# Patient Record
Sex: MALE | Race: OTHER
[De-identification: names, ages, dates, MRNs, and addresses within clinical notes are randomized per-mention and may not be internally consistent; named-entity substitution may affect disease eponyms.]

---

## 2024-05-22 ENCOUNTER — NON-APPOINTMENT (OUTPATIENT)
Age: 49
End: 2024-05-22

## 2024-05-22 DIAGNOSIS — Z86.39 PERSONAL HISTORY OF OTHER ENDOCRINE, NUTRITIONAL AND METABOLIC DISEASE: ICD-10-CM

## 2024-05-22 DIAGNOSIS — Z78.9 OTHER SPECIFIED HEALTH STATUS: ICD-10-CM

## 2024-05-22 DIAGNOSIS — Z86.69 PERSONAL HISTORY OF OTHER DISEASES OF THE NERVOUS SYSTEM AND SENSE ORGANS: ICD-10-CM

## 2024-05-22 DIAGNOSIS — Z86.19 PERSONAL HISTORY OF OTHER INFECTIOUS AND PARASITIC DISEASES: ICD-10-CM

## 2024-05-22 PROBLEM — Z00.00 ENCOUNTER FOR PREVENTIVE HEALTH EXAMINATION: Status: ACTIVE | Noted: 2024-05-22

## 2024-06-04 ENCOUNTER — APPOINTMENT (OUTPATIENT)
Dept: GASTROENTEROLOGY | Facility: CLINIC | Age: 49
End: 2024-06-04
Payer: COMMERCIAL

## 2024-06-04 ENCOUNTER — NON-APPOINTMENT (OUTPATIENT)
Age: 49
End: 2024-06-04

## 2024-06-04 VITALS
DIASTOLIC BLOOD PRESSURE: 80 MMHG | HEART RATE: 83 BPM | HEIGHT: 65 IN | WEIGHT: 217 LBS | BODY MASS INDEX: 36.15 KG/M2 | SYSTOLIC BLOOD PRESSURE: 120 MMHG

## 2024-06-04 DIAGNOSIS — K21.9 GASTRO-ESOPHAGEAL REFLUX DISEASE W/OUT ESOPHAGITIS: ICD-10-CM

## 2024-06-04 DIAGNOSIS — K64.8 OTHER HEMORRHOIDS: ICD-10-CM

## 2024-06-04 DIAGNOSIS — Z83.438 FAMILY HISTORY OF OTHER DISORDER OF LIPOPROTEIN METABOLISM AND OTHER LIPIDEMIA: ICD-10-CM

## 2024-06-04 DIAGNOSIS — Z12.11 ENCOUNTER FOR SCREENING FOR MALIGNANT NEOPLASM OF COLON: ICD-10-CM

## 2024-06-04 PROCEDURE — 99204 OFFICE O/P NEW MOD 45 MIN: CPT

## 2024-06-04 NOTE — HISTORY OF PRESENT ILLNESS
[FreeTextEntry1] :    This HPI  reflects a summary and review of records : including previous and most recent  Labs, body imaging, consults and progress notes, operative and pathology reports, EKG reports, ED records, found in "[employer] [facility],  [facility] and any additional records brought in by  the patient at the time of the visit.   PCP:  Shefali  49 yo M w h/o HLD, Lyme Dis, Bell's  + H/O Snoring, no DTF, BMI=36 ,neck=18  ,STOP-Bang=4 ,M[place]ampatti=4 GERD Hemorrhoids  6/4/24    Today:  Feeling well, no c/o , CP, SOB/ [facility], Cough, Wheeze, Palpitations, edema  6/4/24   Today:   Used to get Ht burn, but changed his diet, lost some wt                           Stopped triggers: coffee, tomato sauce, spicey foods                           No Dysphagia , anorexia, early sateity   * Abd pain-->no * Nausea--> no * Vomit--> no * Early satiety--> no * Belching--> no * Hiccups--> no * Regurgitation--> no * Acid Taste / Water Brash--> no * Ht burn--> no * Dysphagia--> no * Throat Clearing--> no * Hoarseness--> no * Post-Nasal Drip--> no * Congestion--> no * Globus--> no * Cough--> no * Wheeze / PC-> -no * BMs: # 4 qd * Constipation--> no * Diarrhea--> no * Bloating--> no * Strain on Defecation--> no * Incompl Evac--> no * Flatulence--> no * Gurgling--> no * Melena--> no * BPBPR-> -no * Anorexia--> no * Wt. Loss--> no

## 2024-06-04 NOTE — ADDENDUM
[FreeTextEntry1] : I spent a total of  45 minutes with this patient encounter . Greater than 50% of the time spent was     devoted to counseling and coordinating care including review of records, pertinent labs     data and studies, as well as discussing diagnosis evaluation and workup, planned     therapeutic interventions and future disposition of care. This includes any additional     research needed to obtain further information in formulating the plan of care of     this patient. This includes counseling the patient about their disease and diagnosis.

## 2024-06-04 NOTE — ASSESSMENT
[FreeTextEntry1] :   GERD:  well  -  controlled,  no ht burn,  dysphagia,  throat clear much better since lost wt and changed diet  Recommend:  * Anti-reflux diet & life-style changes reviewed & re-emphasized.   * HOB elevation /  Bedge use emphasized * Weight reduction & regular exercise emphasized Pepcid AC prn  * No  need for pH Monitor,  Manometry, or  Esophagram  * No  need for ENT  eval/F/U,  * No  need for  Surgical  eval         1. Hemorrhoids:  well - controlled.  No pain,  swelling,  itch,  bleeding * Discussed  the  potential complications of thrombosis,  pain,  infection,  swelling, itching,  bleeding --none recently Recommendations:  * Moderate- High  Fiber Diet was emphasized * 6  --  8 cups of decaffeinated fluid daily was emphasized  * Sitz Bathes as needed ,  No:  Anusol HC  Suppos / Cream  TN BID -- was needed * No:  Tucks BID,  Balneol Lotion,   Calmoseptine Oint -- was needed ;    can use  Prep H prn * No:  need for  Colorectal surgical evaluation for possible ablation        2. Colorectal  Neoplasia  Screening:  to be evaluated   Utilizing teaching posters and anatomical models the following were discussed and emphasized with the patient in detail: * Discussed the pre-malignant potential of polyps * Discussed the importance of f/u surveillance / screening colonoscopy * moderate-High  Fiber Diet was emphasized * Anti-oxidants and ASA/NSAID Therapy emphasized * Given age > 40-49 yo * Recommend Colonoscopy  to  R/O  Colonic Neoplasia-- in 2024       Informed Consent: * The risks & Benefits of Colonoscopy were discussed w patient. * This included but was not limited to perforation, bleeding, sedation /med rxns, missed lesions possibly requiring surgery, blood transfusions, antibiotics & CPR/Intubation. * Pt. understands & agrees to the procedures. The following instructions in regards to the prep and medically essential ( cardiac, pulmonary, sz, psych, endocrine)  pre-op medication administration was reviewed and emphasized with the patient .  * Pt. advised to D/C  ASA/NSAIDs  7  Days  PTP. * [ +++ ]  Dulcolax / Miralax / Mag. Citrate ,  [     ] Prepopik/ Clenpiq ,  [     ] Osmo Prep,  [    ] GoLytely,  prep. reviewed w Pt. * Hold  [           ] AM of procedure. * Hold  [           ] PM  before procedure. * Take  [           ] PM  before procedure. * Take  [           ] AM of procedure.

## 2024-06-13 ENCOUNTER — TRANSCRIPTION ENCOUNTER (OUTPATIENT)
Age: 49
End: 2024-06-13

## 2024-08-04 PROBLEM — Z86.69 HISTORY OF BELL'S PALSY: Status: RESOLVED | Noted: 2024-05-22 | Resolved: 2024-08-04

## 2024-08-08 ENCOUNTER — RESULT REVIEW (OUTPATIENT)
Age: 49
End: 2024-08-08

## 2024-08-09 ENCOUNTER — APPOINTMENT (OUTPATIENT)
Dept: GASTROENTEROLOGY | Facility: HOSPITAL | Age: 49
End: 2024-08-09